# Patient Record
Sex: MALE | Race: WHITE
[De-identification: names, ages, dates, MRNs, and addresses within clinical notes are randomized per-mention and may not be internally consistent; named-entity substitution may affect disease eponyms.]

---

## 2018-09-29 ENCOUNTER — HOSPITAL ENCOUNTER (EMERGENCY)
Dept: HOSPITAL 56 - MW.ED | Age: 33
Discharge: HOME | End: 2018-09-29
Payer: COMMERCIAL

## 2018-09-29 DIAGNOSIS — Z02.89: ICD-10-CM

## 2018-09-29 DIAGNOSIS — Z79.4: ICD-10-CM
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
## 2018-09-29 DIAGNOSIS — E10.9: ICD-10-CM

## 2018-09-29 DIAGNOSIS — S51.011A: Primary | ICD-10-CM

## 2018-09-29 LAB
CHLORIDE SERPL-SCNC: 103 MMOL/L (ref 98–107)
SODIUM SERPL-SCNC: 138 MMOL/L (ref 136–148)

## 2018-09-29 PROCEDURE — 82962 GLUCOSE BLOOD TEST: CPT

## 2018-09-29 PROCEDURE — 99283 EMERGENCY DEPT VISIT LOW MDM: CPT

## 2018-09-29 PROCEDURE — 80053 COMPREHEN METABOLIC PANEL: CPT

## 2018-09-29 PROCEDURE — 83690 ASSAY OF LIPASE: CPT

## 2018-09-29 PROCEDURE — 12001 RPR S/N/AX/GEN/TRNK 2.5CM/<: CPT

## 2018-09-29 PROCEDURE — 84484 ASSAY OF TROPONIN QUANT: CPT

## 2018-09-29 PROCEDURE — 93005 ELECTROCARDIOGRAM TRACING: CPT

## 2018-09-29 PROCEDURE — 36415 COLL VENOUS BLD VENIPUNCTURE: CPT

## 2018-09-29 PROCEDURE — 85025 COMPLETE CBC W/AUTO DIFF WBC: CPT
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
